# Patient Record
Sex: MALE | Race: WHITE | NOT HISPANIC OR LATINO | Employment: FULL TIME | URBAN - METROPOLITAN AREA
[De-identification: names, ages, dates, MRNs, and addresses within clinical notes are randomized per-mention and may not be internally consistent; named-entity substitution may affect disease eponyms.]

---

## 2017-11-10 ENCOUNTER — APPOINTMENT (OUTPATIENT)
Dept: URGENT CARE | Facility: MEDICAL CENTER | Age: 27
End: 2017-11-10
Payer: OTHER MISCELLANEOUS

## 2017-11-10 PROCEDURE — 99283 EMERGENCY DEPT VISIT LOW MDM: CPT

## 2017-11-10 PROCEDURE — G0382 LEV 3 HOSP TYPE B ED VISIT: HCPCS

## 2017-11-10 PROCEDURE — 90471 IMMUNIZATION ADMIN: CPT

## 2023-05-19 ENCOUNTER — HOSPITAL ENCOUNTER (EMERGENCY)
Facility: HOSPITAL | Age: 33
Discharge: HOME | End: 2023-05-19
Attending: EMERGENCY MEDICINE | Admitting: EMERGENCY MEDICINE

## 2023-05-19 VITALS
OXYGEN SATURATION: 97 % | TEMPERATURE: 98.1 F | RESPIRATION RATE: 18 BRPM | DIASTOLIC BLOOD PRESSURE: 79 MMHG | SYSTOLIC BLOOD PRESSURE: 136 MMHG | HEART RATE: 73 BPM

## 2023-05-19 DIAGNOSIS — Z65.8 DOMESTIC PROBLEMS: Primary | ICD-10-CM

## 2023-05-19 NOTE — ED NOTES
Discharge reviewed with patient  Patient verbalized understanding and has no further questions at this time  Patient ambulatory off unit with steady gait         Clearance ADDISON Motley  05/19/23 0144

## 2023-05-19 NOTE — ED PROVIDER NOTES
"History  Chief Complaint   Patient presents with   • Psychiatric Evaluation     Patient arrives via EMS who were called by police after patient reports they found him walking around outside of his house \"trying to convince himself to go in\" -- pt reports he does not like living with his family members and wants to move out  Patient flatly denies any thoughts of self harm or wanting to harm anyone else at time of triage  Patient is a 57-year-old male seen in the emergency department with concern for domestic problems  Patient states that he was in an argument with his father tonight  Patient denies suicidal and homicidal ideation, and appears to be at no imminent risk to himself or others  Patient declines to wait to speak with crisis team   Patient has no other acute medical complaints in the emergency department  Patient states that he would like to be discharged home, and would like a ride home  None       History reviewed  No pertinent past medical history  Past Surgical History:   Procedure Laterality Date   • WISDOM TOOTH EXTRACTION         History reviewed  No pertinent family history  I have reviewed and agree with the history as documented  E-Cigarette/Vaping   • E-Cigarette Use Never User      E-Cigarette/Vaping Substances     Social History     Tobacco Use   • Smoking status: Some Days     Types: Cigars   • Smokeless tobacco: Never   Vaping Use   • Vaping Use: Never used   Substance Use Topics   • Alcohol use: Not Currently   • Drug use: Yes     Types: Marijuana     Comment: Patient reports he has his medical marijuana card       Review of Systems   Constitutional: Negative for chills and fever  HENT: Negative for ear pain and sore throat  Eyes: Negative for pain and visual disturbance  Respiratory: Negative for cough and shortness of breath  Cardiovascular: Negative for chest pain and palpitations  Gastrointestinal: Negative for abdominal pain and vomiting   " Genitourinary: Negative for decreased urine volume and difficulty urinating  Musculoskeletal: Negative for arthralgias and back pain  Skin: Negative for color change and pallor  Neurological: Negative for seizures and syncope  Psychiatric/Behavioral: Negative for self-injury and suicidal ideas  Domestic problems   All other systems reviewed and are negative  Physical Exam  Physical Exam  Vitals and nursing note reviewed  Constitutional:       Appearance: He is well-developed  He is not diaphoretic  HENT:      Head: Normocephalic and atraumatic  Right Ear: External ear normal       Left Ear: External ear normal       Nose: Nose normal       Mouth/Throat:      Pharynx: Oropharynx is clear  Eyes:      General: No scleral icterus  Conjunctiva/sclera: Conjunctivae normal    Cardiovascular:      Rate and Rhythm: Normal rate and regular rhythm  Heart sounds: No murmur heard  Pulmonary:      Effort: Pulmonary effort is normal  No respiratory distress  Breath sounds: Normal breath sounds  Abdominal:      General: There is no distension  Palpations: Abdomen is soft  Tenderness: There is no abdominal tenderness  Musculoskeletal:         General: No swelling or deformity  Cervical back: Normal range of motion and neck supple  Skin:     General: Skin is warm and dry  Neurological:      General: No focal deficit present  Mental Status: He is alert  Cranial Nerves: No cranial nerve deficit  Sensory: No sensory deficit     Psychiatric:      Comments: Intermittent agitation, no homicidal or suicidal ideation         Vital Signs  ED Triage Vitals [05/19/23 0101]   Temperature Pulse Respirations Blood Pressure SpO2   98 1 °F (36 7 °C) 73 18 136/79 97 %      Temp Source Heart Rate Source Patient Position - Orthostatic VS BP Location FiO2 (%)   Oral Monitor Lying Left arm --      Pain Score       --           Vitals:    05/19/23 0101   BP: 136/79 Pulse: 73   Patient Position - Orthostatic VS: Lying         Visual Acuity      ED Medications  Medications - No data to display    Diagnostic Studies  Results Reviewed     None                 No orders to display              Procedures  Procedures         ED Course                               SBIRT 20yo+    Flowsheet Row Most Recent Value   Initial Alcohol Screen: US AUDIT-C     1  How often do you have a drink containing alcohol? 0 Filed at: 05/19/2023 0108   Audit-C Score 0 Filed at: 05/19/2023 0769                    Medical Decision Making  Patient is a 40-year-old male seen in the emergency department with concern for domestic problems  Patient denies suicidal and homicidal ideation, and appears to be at no imminent risk to himself or others  Patient has no other acute medical complaints in the emergency department, and states that he would like to be discharged home  Plan to have patient follow up with PCP/outpatient providers  Discharge instructions were reviewed with patient  Domestic problems: acute illness or injury      Disposition  Final diagnoses:   Domestic problems     Time reflects when diagnosis was documented in both MDM as applicable and the Disposition within this note     Time User Action Codes Description Comment    5/19/2023  1:16 AM Manoj Maldonado [Z65 [id number]] Domestic problems       ED Disposition     ED Disposition   Discharge    Condition   Stable    Date/Time   Fri May 19, 2023  1:16 AM    Comment   Ronak Payne discharge to home/self care                 Follow-up Information     Follow up With Specialties Details Why Contact Info Additional Information    Your primary doctor  Call in 1 day       [place] [facility]  As needed [address] [id number]  [id number] , [place], [place], [address] [facility] Therapist HERIBERTO [facility] Call  As needed [facility]  [phone] [facility] Therapist [address], [address] [place] [address], [id number], [phone]          Patient's Medications    No medications on file           PDMP Review     None          ED Provider  Electronically Signed by           Vinod Bryan MD  05/19/23 1299

## 2023-05-31 ENCOUNTER — TELEPHONE (OUTPATIENT)
Dept: PSYCHIATRY | Facility: CLINIC | Age: 33
End: 2023-05-31

## 2023-05-31 NOTE — TELEPHONE ENCOUNTER
"Patient returned called wanting clarification on what it was in regards to  Explained that intake called regarding a routine referral  After explaining what services are offered patient refused and requested the office not contact them again and to \"get away\"  Referral closed    "